# Patient Record
Sex: FEMALE | Race: WHITE
[De-identification: names, ages, dates, MRNs, and addresses within clinical notes are randomized per-mention and may not be internally consistent; named-entity substitution may affect disease eponyms.]

---

## 2017-12-19 ENCOUNTER — HOSPITAL ENCOUNTER (OUTPATIENT)
Dept: HOSPITAL 92 - TBSIIMAG | Age: 62
Discharge: HOME | End: 2017-12-19
Attending: NEUROLOGICAL SURGERY
Payer: COMMERCIAL

## 2017-12-19 DIAGNOSIS — Z98.1: ICD-10-CM

## 2017-12-19 DIAGNOSIS — M54.12: Primary | ICD-10-CM

## 2017-12-19 PROCEDURE — 72040 X-RAY EXAM NECK SPINE 2-3 VW: CPT

## 2017-12-19 NOTE — RAD
CERVICAL SPINE THREE VIEWS:

 

History: Follow up. 

 

Comparison: None. 

 

FINDINGS: 

Open mouth odontoid view is normal. Satisfactory appearance of the ACDF hardware at C6-7 with normal 
location of the discectomy cage. Mild facet arthrosis at C5-6 and C6-7. No abnormal listhesis. 

 

IMPRESSION: 

Satisfactory appearance post ACDF at C6-7.

 

POS: [facility]

## 2018-03-23 ENCOUNTER — HOSPITAL ENCOUNTER (EMERGENCY)
Dept: HOSPITAL 92 - ERS | Age: 63
Discharge: HOME | End: 2018-03-23
Payer: COMMERCIAL

## 2018-03-23 DIAGNOSIS — R07.9: Primary | ICD-10-CM

## 2018-03-23 DIAGNOSIS — Z79.899: ICD-10-CM

## 2018-03-23 LAB
ALBUMIN SERPL BCG-MCNC: 4.3 G/DL (ref 3.5–5)
ALP SERPL-CCNC: 65 U/L (ref 40–150)
ALT SERPL W P-5'-P-CCNC: 13 U/L (ref 8–55)
ANION GAP SERPL CALC-SCNC: 14 MMOL/L (ref 10–20)
AST SERPL-CCNC: 18 U/L (ref 5–34)
BASOPHILS # BLD AUTO: 0 THOU/UL (ref 0–0.2)
BASOPHILS NFR BLD AUTO: 0.7 % (ref 0–1)
BILIRUB SERPL-MCNC: 0.7 MG/DL (ref 0.2–1.2)
BUN SERPL-MCNC: 16 MG/DL (ref 9.8–20.1)
CALCIUM SERPL-MCNC: 10.3 MG/DL (ref 7.8–10.44)
CHLORIDE SERPL-SCNC: 104 MMOL/L (ref 98–107)
CK MB SERPL-MCNC: 2 NG/ML (ref 0–6.6)
CK SERPL-CCNC: 104 U/L (ref 29–168)
CO2 SERPL-SCNC: 26 MMOL/L (ref 22–29)
CREAT CL PREDICTED SERPL C-G-VRATE: 0 ML/MIN (ref 70–130)
EOSINOPHIL # BLD AUTO: 0.3 THOU/UL (ref 0–0.7)
EOSINOPHIL NFR BLD AUTO: 4 % (ref 0–10)
GLOBULIN SER CALC-MCNC: 3 G/DL (ref 2.4–3.5)
GLUCOSE SERPL-MCNC: 100 MG/DL (ref 70–105)
HGB BLD-MCNC: 13.3 G/DL (ref 12–16)
LYMPHOCYTES # BLD: 1.8 THOU/UL (ref 1.2–3.4)
LYMPHOCYTES NFR BLD AUTO: 25.2 % (ref 21–51)
MCH RBC QN AUTO: 33.1 PG (ref 27–31)
MCV RBC AUTO: 101 FL (ref 81–99)
MONOCYTES # BLD AUTO: 0.6 THOU/UL (ref 0.11–0.59)
MONOCYTES NFR BLD AUTO: 7.5 % (ref 0–10)
NEUTROPHILS # BLD AUTO: 4.6 THOU/UL (ref 1.4–6.5)
NEUTROPHILS NFR BLD AUTO: 62.7 % (ref 42–75)
PLATELET # BLD AUTO: 423 THOU/UL (ref 130–400)
POTASSIUM SERPL-SCNC: 5.3 MMOL/L (ref 3.5–5.1)
RBC # BLD AUTO: 4.01 MILL/UL (ref 4.2–5.4)
SODIUM SERPL-SCNC: 139 MMOL/L (ref 136–145)
TROPONIN I SERPL DL<=0.01 NG/ML-MCNC: (no result) NG/ML (ref ?–0.03)
WBC # BLD AUTO: 7.3 THOU/UL (ref 4.8–10.8)

## 2018-03-23 PROCEDURE — 93005 ELECTROCARDIOGRAM TRACING: CPT

## 2018-03-23 PROCEDURE — 94760 N-INVAS EAR/PLS OXIMETRY 1: CPT

## 2018-03-23 PROCEDURE — 71045 X-RAY EXAM CHEST 1 VIEW: CPT

## 2018-03-23 PROCEDURE — 80053 COMPREHEN METABOLIC PANEL: CPT

## 2018-03-23 PROCEDURE — 85025 COMPLETE CBC W/AUTO DIFF WBC: CPT

## 2018-03-23 PROCEDURE — 36415 COLL VENOUS BLD VENIPUNCTURE: CPT

## 2018-03-23 PROCEDURE — 82550 ASSAY OF CK (CPK): CPT

## 2018-03-23 PROCEDURE — 84484 ASSAY OF TROPONIN QUANT: CPT

## 2018-03-23 PROCEDURE — 82553 CREATINE MB FRACTION: CPT

## 2018-03-23 NOTE — RAD
CHEST ONE VIEW:

 

History: Chest pain. 

 

Comparison: 3-20-18

 

FINDINGS: 

Lungs are clear. No pneumothorax or effusion. Cardiac silhouette and mediastinal contours are within 
normal limits. 

 

IMPRESSION: 

No acute intrathoracic abnormality. 

 

POS: [facility]

## 2018-04-03 ENCOUNTER — HOSPITAL ENCOUNTER (OUTPATIENT)
Dept: HOSPITAL 92 - TBSIIMAG | Age: 63
Discharge: HOME | End: 2018-04-03
Attending: INTERNAL MEDICINE
Payer: COMMERCIAL

## 2018-04-03 ENCOUNTER — HOSPITAL ENCOUNTER (OUTPATIENT)
Dept: HOSPITAL 92 - BICMAMMO | Age: 63
Discharge: HOME | End: 2018-04-03
Attending: FAMILY MEDICINE
Payer: COMMERCIAL

## 2018-04-03 DIAGNOSIS — Z12.31: Primary | ICD-10-CM

## 2018-04-03 DIAGNOSIS — R94.39: Primary | ICD-10-CM

## 2018-04-03 DIAGNOSIS — R06.09: ICD-10-CM

## 2018-04-03 PROCEDURE — 75574 CT ANGIO HRT W/3D IMAGE: CPT

## 2018-04-03 PROCEDURE — 77067 SCR MAMMO BI INCL CAD: CPT

## 2018-04-03 PROCEDURE — 77063 BREAST TOMOSYNTHESIS BI: CPT

## 2018-05-23 ENCOUNTER — HOSPITAL ENCOUNTER (OUTPATIENT)
Dept: HOSPITAL 92 - CT | Age: 63
Discharge: HOME | End: 2018-05-23
Attending: INTERNAL MEDICINE
Payer: COMMERCIAL

## 2018-05-23 DIAGNOSIS — R94.30: Primary | ICD-10-CM

## 2018-05-23 LAB — ESTIMATED GFR-MDRD - POC: 73

## 2018-05-23 PROCEDURE — 75574 CT ANGIO HRT W/3D IMAGE: CPT

## 2018-05-23 PROCEDURE — 82565 ASSAY OF CREATININE: CPT

## 2018-12-19 ENCOUNTER — HOSPITAL ENCOUNTER (OUTPATIENT)
Dept: HOSPITAL 92 - ENDO/OP | Age: 63
End: 2018-12-19
Attending: INTERNAL MEDICINE
Payer: COMMERCIAL

## 2018-12-19 DIAGNOSIS — Z79.899: ICD-10-CM

## 2018-12-19 DIAGNOSIS — Z98.1: ICD-10-CM

## 2018-12-19 DIAGNOSIS — S14.8XXA: ICD-10-CM

## 2018-12-19 DIAGNOSIS — Z88.6: ICD-10-CM

## 2018-12-19 DIAGNOSIS — K21.9: Primary | ICD-10-CM

## 2018-12-19 DIAGNOSIS — Z88.0: ICD-10-CM

## 2018-12-19 PROCEDURE — 91034 GASTROESOPHAGEAL REFLUX TEST: CPT

## 2019-03-07 ENCOUNTER — HOSPITAL ENCOUNTER (OUTPATIENT)
Dept: HOSPITAL 92 - SCSMRI | Age: 64
Discharge: HOME | End: 2019-03-07
Attending: NEUROLOGICAL SURGERY
Payer: COMMERCIAL

## 2019-03-07 DIAGNOSIS — M48.02: ICD-10-CM

## 2019-03-07 DIAGNOSIS — M54.12: Primary | ICD-10-CM

## 2019-03-07 LAB — ESTIMATED GFR-MDRD - POC: 85

## 2019-03-07 PROCEDURE — 72156 MRI NECK SPINE W/O & W/DYE: CPT

## 2019-03-07 PROCEDURE — 82565 ASSAY OF CREATININE: CPT

## 2019-03-07 NOTE — MRI
PRE AND POSTCONTRAST ENHANCED MRI IMAGES CERVICAL SPINE

3/7/19

 

HISTORY: 

Fusion. Neck pain. Numbness. 

 

Multiplanar and multisequence pre and postcontrast enhanced MRI images cervical spine obtained. 

 

Images demonstrate the spinal cord to be unremarkable with no evidence of masses or lesions. 

 

C1-2: Unremarkable.

 

C2-3: There is a mild broad based disc bulge. The central canal and neural foramen are patent. 

 

C3-4: Disc desiccation seen. There is a broad based disc osteophyte complex centrally resulting in mi
ld central stenosis. Mild right sided neural foraminal narrowing seen. The left neural foramen is pat
ent. 

 

C4-5: Disc desiccation seen. There is a broad based disc osteophyte complex seen centrally compressin
g the thecal sac resulting in moderate degree of central stenosis. There is severe bilateral C4-5 markie
ral foraminal narrowing due to uncovertebral osteophyte hypertrophy. 

 

C5-6: There is disc desiccation seen.  There is a broad based disc osteophyte complex centrally compr
essing the thecal sac resulting in moderate to severe central and lateral recess stenosis. There is m
oderate  severe bilateral C5-6 neural foraminal narrowing due to osteophyte encroachment. 

 

C6-7: ACDF is seen. The central canal and neural foramen are patent. 

 

C7-T1: Unremarkable.

 

IMPRESSION:  

C4-5 and C5-6 central and neural foraminal narrowing as described above. 

 

POS: [facility]

## 2019-04-04 ENCOUNTER — HOSPITAL ENCOUNTER (OUTPATIENT)
Dept: HOSPITAL 92 - BICMAMMO | Age: 64
Discharge: HOME | End: 2019-04-04
Attending: FAMILY MEDICINE
Payer: SELF-PAY

## 2019-04-04 DIAGNOSIS — Z12.31: Primary | ICD-10-CM

## 2019-04-04 PROCEDURE — 77067 SCR MAMMO BI INCL CAD: CPT

## 2019-04-04 PROCEDURE — 77063 BREAST TOMOSYNTHESIS BI: CPT

## 2019-04-04 NOTE — MMO
Bilateral MAMMO Bilat Screen [facility].

 

CLINICAL HISTORY:

Patient is 63 years old and is seen for screening. The patient has no family

history of breast cancer.  The patient has no personal history of cancer.

 

VIEWS:

The views performed were:  bilateral craniocaudal with tomosynthesis and

bilateral mediolateral oblique with tomosynthesis.

 

FILMS COMPARED:

The present examination has been compared to prior imaging studies performed at

[facility] on 04/03/2018, at [facility] on

02/08/2017, and at [facility] on 03/20/2013, 12/09/2014 and

01/18/2016.

 

MAMMOGRAM FINDINGS:

The breasts are heterogeneously dense, which could obscure a lesion on

mammography.

 

There are no suspicious masses, suspicious calcifications, or new areas of

architectural distortion.

 

IMPRESSION:

THERE IS NO MAMMOGRAPHIC EVIDENCE OF MALIGNANCY.

 

A ROUTINE FOLLOW-UP MAMMOGRAM IN 1 YEAR IS RECOMMENDED.

 

THE RESULTS OF THIS EXAM WERE SENT TO THE PATIENT.

 

ACR BI-RADS Category 1 - Negative

 

MAMMOGRAPHY NOTE:

 1. A negative mammogram report should not delay a biopsy if a dominant of

 clinically suspicious mass is present.

 2. Approximately 10% to 15% of breast cancers are not detected by

 mammography.

 3. Adenosis and dense breasts may obscure an underlying neoplasm.

## 2019-09-26 ENCOUNTER — HOSPITAL ENCOUNTER (OUTPATIENT)
Dept: HOSPITAL 92 - SDC | Age: 64
Discharge: HOME | End: 2019-09-26
Attending: INTERNAL MEDICINE
Payer: COMMERCIAL

## 2019-09-26 VITALS — BODY MASS INDEX: 22.1 KG/M2

## 2019-09-26 DIAGNOSIS — K64.4: ICD-10-CM

## 2019-09-26 DIAGNOSIS — D12.4: ICD-10-CM

## 2019-09-26 DIAGNOSIS — Z88.2: ICD-10-CM

## 2019-09-26 DIAGNOSIS — J30.81: ICD-10-CM

## 2019-09-26 DIAGNOSIS — K64.8: ICD-10-CM

## 2019-09-26 DIAGNOSIS — Z88.0: ICD-10-CM

## 2019-09-26 DIAGNOSIS — D12.3: ICD-10-CM

## 2019-09-26 DIAGNOSIS — Z12.11: Primary | ICD-10-CM

## 2019-09-26 DIAGNOSIS — Z79.899: ICD-10-CM

## 2019-09-26 DIAGNOSIS — Z88.6: ICD-10-CM

## 2019-09-26 PROCEDURE — 0DBL8ZX EXCISION OF TRANSVERSE COLON, VIA NATURAL OR ARTIFICIAL OPENING ENDOSCOPIC, DIAGNOSTIC: ICD-10-PCS | Performed by: INTERNAL MEDICINE

## 2019-09-26 PROCEDURE — 88305 TISSUE EXAM BY PATHOLOGIST: CPT

## 2019-09-26 PROCEDURE — 0DBM8ZX EXCISION OF DESCENDING COLON, VIA NATURAL OR ARTIFICIAL OPENING ENDOSCOPIC, DIAGNOSTIC: ICD-10-PCS | Performed by: INTERNAL MEDICINE

## 2019-09-26 NOTE — OP
DATE OF PROCEDURE:  09/26/2019



PROCEDURES PERFORMED:  Colonoscopy with snare polypectomy and biopsy.



PREPROCEDURE DIAGNOSIS:  Average-risk colon cancer screening.



POSTPROCEDURE DIAGNOSES:  

1. Exam to cecum; good bowel preparation.

2. Diffusely redundant colon.

3. Diminutive sessile polyps (2-mm) in the proximal transverse colon, excised by

cold biopsy forceps. 

4. Diminutive sessile polyp in the descending colon, 2 mm in diameter, excised by

cold biopsy forceps. 

5. Small sessile polyp in the descending colon, 3 mm in diameter, excised by cold

snare technique. 

6. Small internal hemorrhoids.

7. Otherwise normal colonoscopy.



PROCEDURE IN DETAIL:  Written informed consent was obtained.  The patient was

brought to the endoscopy suite.  Total intravenous anesthesia was administered by

Dr. Jani Diallo and associates.  The patient was placed in the left lateral

decubitus position.  A digital rectal exam was performed that was unremarkable.  A

Pentax video colonoscope was then inserted through the anal canal and advanced under

direct visualization to the cecum.  Position in the cecum was verified by clear

identification of the ileocecal valve and appendiceal orifice.  The quality of the

bowel preparation was good.  Each colon segment was examined carefully as the

colonoscope was slowly withdrawn from the cecum.  Mild diffuse darkening of the

colonic mucosa was noted throughout.  Vascular pattern however appeared intact and

normal.  In the proximal transverse colon, a diminutive sessile polyp about 2-mm in

diameter was identified and excised by cold biopsy forceps.  A similar sized polyp

was also noted in the descending colon and again excised with cold biopsy forceps.

Both polyps were submitted to pathology.  A third polyp was identified in the

descending colon, approximately 3-mm in diameter and sessile.  It was removed by

cold snare technique.  This tissue was also submitted to Pathology.  In the rectum,

a retroflexed view demonstrated small edematous internal hemorrhoids that were not

actively bleeding.  A hypertrophied anal papilla was also noted.  The colon was

decompressed as the colonoscope was removed from the patient.  The patient was

transferred to the Day Stay surgery area for postprocedure monitoring.  There were

no immediate complications. 



RECOMMENDATIONS:  

1. Await pathology results.

2. Ask the patient to call me in 1 week for pathology results.

3. Pending pathology results, recommend surveillance colonoscopy in 5 years.

4. Follow up with GI as needed.







Job ID:  [id number]

## 2019-09-27 ENCOUNTER — HOSPITAL ENCOUNTER (OUTPATIENT)
Dept: HOSPITAL 92 - BICMAMMO | Age: 64
Discharge: HOME | End: 2019-09-27
Attending: FAMILY MEDICINE
Payer: COMMERCIAL

## 2019-09-27 DIAGNOSIS — M85.89: ICD-10-CM

## 2019-09-27 DIAGNOSIS — Z13.820: Primary | ICD-10-CM

## 2019-09-27 PROCEDURE — 77080 DXA BONE DENSITY AXIAL: CPT

## 2019-09-27 NOTE — BD
DEXA BONE DENSITY EXAM:

 

 

HISTORY: 

A 63-year-old postmenopausal female for screening.

 

COMPARISON: 

9/21/2010.

 

FINDINGS: 

 

Lumbar Spine:       BMD (g/cm2)

    L1                0.930              T-Score: -0.5

    L2                0.987              T-Score: -0.4

    L3                0.943              T-Score: -1.3

    L4                0.940              T-Score: -1.1

 

    L1-L4             0.950              T-Score: -0.9

 

Femoral Neck:         0.644              T-Score: -1.8

 

Total Femur:          0.806              T-Score: -1.1

 

Impression:

Osteopenia.  This patient has a 10-year WHO fracture risk for a major osteoporotic fracture of 8.8% a
nd of a hip fracture 1.1%.

 

POS: [facility]

## 2020-10-01 ENCOUNTER — HOSPITAL ENCOUNTER (OUTPATIENT)
Dept: HOSPITAL 92 - BICMAMMO | Age: 65
Discharge: HOME | End: 2020-10-01
Attending: FAMILY MEDICINE
Payer: COMMERCIAL

## 2020-10-01 DIAGNOSIS — Z12.31: Primary | ICD-10-CM

## 2020-10-01 PROCEDURE — 77067 SCR MAMMO BI INCL CAD: CPT

## 2020-10-01 PROCEDURE — 77063 BREAST TOMOSYNTHESIS BI: CPT

## 2020-10-01 NOTE — MMO
Bilateral MAMMO Bilat Screen [facility].

 

CLINICAL HISTORY:

Patient is 64 years old and is seen for screening. The patient has no family

history of breast cancer.  The patient has no personal history of cancer.

 

VIEWS:

The views performed were:  bilateral craniocaudal with tomosynthesis; bilateral

mediolateral oblique with tomosynthesis; and bilateral exaggerated craniocaudal.

 

FILMS COMPARED:

The present examination has been compared to prior imaging studies performed at

[facility] on 04/03/2018 and 04/04/2019, and at [facility] on 02/08/2017.

 

This study has been interpreted with the assistance of computer-aided detection.

 

MAMMOGRAM FINDINGS:

The breasts are heterogeneously dense, which could obscure a lesion on

mammography.

 

There are no suspicious masses, suspicious calcifications, or new areas of

architectural distortion.

 

IMPRESSION:

THERE IS NO MAMMOGRAPHIC EVIDENCE OF MALIGNANCY.

 

A ROUTINE FOLLOW-UP MAMMOGRAM IN 1 YEAR IS RECOMMENDED.

 

THE RESULTS OF THIS EXAM WERE SENT TO THE PATIENT.

 

ACR BI-RADS Category 1 - Negative

 

MAMMOGRAPHY NOTE:

 1. A negative mammogram report should not delay a biopsy if a dominant of

 clinically suspicious mass is present.

 2. Approximately 10% to 15% of breast cancers are not detected by

 mammography.

 3. Adenosis and dense breasts may obscure an underlying neoplasm.

 

 

Reported by: CASA AMBROSIO MD

Electonically Signed: [id number]

## 2020-12-30 ENCOUNTER — HOSPITAL ENCOUNTER (OUTPATIENT)
Dept: HOSPITAL 92 - BICRAD | Age: 65
Discharge: HOME | End: 2020-12-30
Attending: FAMILY MEDICINE
Payer: COMMERCIAL

## 2020-12-30 DIAGNOSIS — M47.814: ICD-10-CM

## 2020-12-30 DIAGNOSIS — M54.2: ICD-10-CM

## 2020-12-30 DIAGNOSIS — Z98.1: ICD-10-CM

## 2020-12-30 DIAGNOSIS — M54.12: Primary | ICD-10-CM

## 2020-12-30 DIAGNOSIS — M41.85: ICD-10-CM

## 2020-12-30 DIAGNOSIS — Z98.890: ICD-10-CM

## 2020-12-30 PROCEDURE — 72040 X-RAY EXAM NECK SPINE 2-3 VW: CPT

## 2020-12-30 PROCEDURE — 72072 X-RAY EXAM THORAC SPINE 3VWS: CPT

## 2020-12-30 NOTE — RAD
THORACIC SPINE THREE VIEWS:

12/30/20

 

HISTORY: 

Thoracic spine pain. 

 

Mild scoliotic changes, S=shaped, involving the thoracolumbar vertebral column. Anterior cervical fus
ion changes at C6-7. Minimal disc osteophytosis. No evidence for acute fracture or dislocation or foc
al bone lesion.

 

IMPRESSION: 

1.      Mild S-shaped scoliosis of the thoracolumbar vertebral column. 

2.      Thoracic spondylosis.

3.      Anterior cervical fusion changes at C6-C7. 

 

POS: [facility]

## 2020-12-30 NOTE — RAD
Cervical spine 3 views



HISTORY: Neck pain.



COMPARISON: 12/19/2017.



FINDINGS: Vertebral body heights and alignment are maintained. Cervicothoracic junction is intact. An
terior operative fixation at the C6-7 level without apparent hardware lucency. Osseous ingrowth of

the disc space has increased.



No acute fracture or dislocation.



  



IMPRESSION :

Postoperative changes, stable. No abnormalities are demonstrated.



Reported By: KELLY Gaxiola 

Electronically Signed:  12/30/2020 4:11 PM

## 2021-10-07 ENCOUNTER — HOSPITAL ENCOUNTER (OUTPATIENT)
Dept: HOSPITAL 92 - BICMAMMO | Age: 66
Discharge: HOME | End: 2021-10-07
Attending: FAMILY MEDICINE
Payer: COMMERCIAL

## 2021-10-07 DIAGNOSIS — Z12.31: Primary | ICD-10-CM

## 2021-10-07 PROCEDURE — 77067 SCR MAMMO BI INCL CAD: CPT

## 2021-10-07 PROCEDURE — 77063 BREAST TOMOSYNTHESIS BI: CPT

## 2022-10-11 ENCOUNTER — HOSPITAL ENCOUNTER (OUTPATIENT)
Dept: HOSPITAL 92 - BICMAMMO | Age: 67
Discharge: HOME | End: 2022-10-11
Attending: FAMILY MEDICINE
Payer: MEDICARE

## 2022-10-11 DIAGNOSIS — Z12.31: Primary | ICD-10-CM

## 2022-10-11 PROCEDURE — 77063 BREAST TOMOSYNTHESIS BI: CPT

## 2022-10-11 PROCEDURE — 77067 SCR MAMMO BI INCL CAD: CPT

## 2023-03-13 ENCOUNTER — HOSPITAL ENCOUNTER (OUTPATIENT)
Dept: HOSPITAL 92 - BICMAMMO | Age: 68
Discharge: HOME | End: 2023-03-13
Attending: FAMILY MEDICINE
Payer: MEDICARE

## 2023-03-13 DIAGNOSIS — M85.851: ICD-10-CM

## 2023-03-13 DIAGNOSIS — M85.852: ICD-10-CM

## 2023-03-13 DIAGNOSIS — Z78.0: ICD-10-CM

## 2023-03-13 DIAGNOSIS — Z13.820: Primary | ICD-10-CM

## 2023-03-13 PROCEDURE — 77080 DXA BONE DENSITY AXIAL: CPT

## 2023-10-23 ENCOUNTER — HOSPITAL ENCOUNTER (OUTPATIENT)
Dept: HOSPITAL 92 - BICMAMMO | Age: 68
Discharge: HOME | End: 2023-10-23
Attending: FAMILY MEDICINE
Payer: MEDICARE

## 2023-10-23 DIAGNOSIS — Z12.31: Primary | ICD-10-CM

## 2023-10-23 PROCEDURE — 77067 SCR MAMMO BI INCL CAD: CPT

## 2023-10-23 PROCEDURE — 77063 BREAST TOMOSYNTHESIS BI: CPT

## 2024-10-28 ENCOUNTER — HOSPITAL ENCOUNTER (OUTPATIENT)
Dept: HOSPITAL 92 - BICMAMMO | Age: 69
Discharge: HOME | End: 2024-10-28
Attending: FAMILY MEDICINE
Payer: MEDICARE

## 2024-10-28 DIAGNOSIS — Z12.31: Primary | ICD-10-CM

## 2024-10-28 PROCEDURE — 77063 BREAST TOMOSYNTHESIS BI: CPT

## 2024-10-28 PROCEDURE — 77067 SCR MAMMO BI INCL CAD: CPT
